# Patient Record
Sex: FEMALE | Race: WHITE | NOT HISPANIC OR LATINO | URBAN - METROPOLITAN AREA
[De-identification: names, ages, dates, MRNs, and addresses within clinical notes are randomized per-mention and may not be internally consistent; named-entity substitution may affect disease eponyms.]

---

## 2017-07-12 ENCOUNTER — APPOINTMENT (RX ONLY)
Dept: URBAN - METROPOLITAN AREA CLINIC 75 | Facility: CLINIC | Age: 42
Setting detail: DERMATOLOGY
End: 2017-07-12

## 2017-07-12 VITALS — HEIGHT: 69 IN | WEIGHT: 130 LBS

## 2017-07-12 DIAGNOSIS — D18.0 HEMANGIOMA: ICD-10-CM

## 2017-07-12 DIAGNOSIS — Z71.89 OTHER SPECIFIED COUNSELING: ICD-10-CM

## 2017-07-12 DIAGNOSIS — B07.8 OTHER VIRAL WARTS: ICD-10-CM

## 2017-07-12 DIAGNOSIS — D22 MELANOCYTIC NEVI: ICD-10-CM

## 2017-07-12 DIAGNOSIS — L81.4 OTHER MELANIN HYPERPIGMENTATION: ICD-10-CM

## 2017-07-12 PROBLEM — D22.5 MELANOCYTIC NEVI OF TRUNK: Status: ACTIVE | Noted: 2017-07-12

## 2017-07-12 PROBLEM — D18.01 HEMANGIOMA OF SKIN AND SUBCUTANEOUS TISSUE: Status: ACTIVE | Noted: 2017-07-12

## 2017-07-12 PROCEDURE — 99214 OFFICE O/P EST MOD 30 MIN: CPT

## 2017-07-12 PROCEDURE — ? IN-HOUSE DISPENSING PHARMACY

## 2017-07-12 PROCEDURE — ? COUNSELING

## 2017-07-12 ASSESSMENT — LOCATION SIMPLE DESCRIPTION DERM
LOCATION SIMPLE: RIGHT UPPER BACK
LOCATION SIMPLE: RIGHT HAND
LOCATION SIMPLE: RIGHT INDEX FINGER

## 2017-07-12 ASSESSMENT — LOCATION ZONE DERM
LOCATION ZONE: TRUNK
LOCATION ZONE: HAND
LOCATION ZONE: FINGER

## 2017-07-12 ASSESSMENT — LOCATION DETAILED DESCRIPTION DERM
LOCATION DETAILED: RIGHT INFERIOR UPPER BACK
LOCATION DETAILED: RIGHT SUPERIOR MEDIAL UPPER BACK
LOCATION DETAILED: RIGHT PROXIMAL DORSAL INDEX FINGER
LOCATION DETAILED: RIGHT DORSAL INDEX METACARPOPHALANGEAL JOINT

## 2018-06-15 ENCOUNTER — APPOINTMENT (RX ONLY)
Dept: URBAN - METROPOLITAN AREA CLINIC 76 | Facility: CLINIC | Age: 43
Setting detail: DERMATOLOGY
End: 2018-06-15

## 2018-06-15 DIAGNOSIS — Z00.00 ENCOUNTER FOR GENERAL ADULT MEDICAL EXAMINATION WITHOUT ABNORMAL FINDINGS: ICD-10-CM

## 2018-06-15 DIAGNOSIS — D22 MELANOCYTIC NEVI: ICD-10-CM

## 2018-06-15 DIAGNOSIS — B07.8 OTHER VIRAL WARTS: ICD-10-CM

## 2018-06-15 DIAGNOSIS — L81.4 OTHER MELANIN HYPERPIGMENTATION: ICD-10-CM

## 2018-06-15 DIAGNOSIS — Z87.2 PERSONAL HISTORY OF DISEASES OF THE SKIN AND SUBCUTANEOUS TISSUE: ICD-10-CM

## 2018-06-15 DIAGNOSIS — Z71.89 OTHER SPECIFIED COUNSELING: ICD-10-CM

## 2018-06-15 PROBLEM — D22.62 MELANOCYTIC NEVI OF LEFT UPPER LIMB, INCLUDING SHOULDER: Status: ACTIVE | Noted: 2018-06-15

## 2018-06-15 PROBLEM — D22.61 MELANOCYTIC NEVI OF RIGHT UPPER LIMB, INCLUDING SHOULDER: Status: ACTIVE | Noted: 2018-06-15

## 2018-06-15 PROBLEM — Z71.1 PERSON WITH FEARED HEALTH COMPLAINT IN WHOM NO DIAGNOSIS IS MADE: Status: ACTIVE | Noted: 2018-06-15

## 2018-06-15 PROBLEM — D23.72 OTHER BENIGN NEOPLASM OF SKIN OF LEFT LOWER LIMB, INCLUDING HIP: Status: ACTIVE | Noted: 2018-06-15

## 2018-06-15 PROBLEM — D48.5 NEOPLASM OF UNCERTAIN BEHAVIOR OF SKIN: Status: ACTIVE | Noted: 2018-06-15

## 2018-06-15 PROBLEM — D22.71 MELANOCYTIC NEVI OF RIGHT LOWER LIMB, INCLUDING HIP: Status: ACTIVE | Noted: 2018-06-15

## 2018-06-15 PROBLEM — D22.72 MELANOCYTIC NEVI OF LEFT LOWER LIMB, INCLUDING HIP: Status: ACTIVE | Noted: 2018-06-15

## 2018-06-15 PROBLEM — D22.5 MELANOCYTIC NEVI OF TRUNK: Status: ACTIVE | Noted: 2018-06-15

## 2018-06-15 PROBLEM — Z85.3 PERSONAL HISTORY OF MALIGNANT NEOPLASM OF BREAST: Status: ACTIVE | Noted: 2018-06-15

## 2018-06-15 PROCEDURE — ? COUNSELING

## 2018-06-15 PROCEDURE — 17110 DESTRUCTION B9 LES UP TO 14: CPT

## 2018-06-15 PROCEDURE — ? BIOPSY BY SHAVE METHOD

## 2018-06-15 PROCEDURE — 11100: CPT | Mod: 59

## 2018-06-15 PROCEDURE — 99212 OFFICE O/P EST SF 10 MIN: CPT | Mod: 25

## 2018-06-15 PROCEDURE — ? TREATMENT REGIMEN

## 2018-06-15 PROCEDURE — ? BENIGN DESTRUCTION

## 2018-06-15 ASSESSMENT — LOCATION DETAILED DESCRIPTION DERM
LOCATION DETAILED: RIGHT PROXIMAL DORSAL INDEX FINGER
LOCATION DETAILED: LEFT SUPERIOR UPPER BACK
LOCATION DETAILED: RIGHT DORSAL INDEX METACARPOPHALANGEAL JOINT
LOCATION DETAILED: RIGHT INDEX PROXIMAL INTERPHALANGEAL JOINT
LOCATION DETAILED: LEFT PROXIMAL CALF
LOCATION DETAILED: RIGHT SUPERIOR LATERAL MIDBACK
LOCATION DETAILED: RIGHT MID DORSAL INDEX FINGER
LOCATION DETAILED: LEFT DISTAL POSTERIOR UPPER ARM
LOCATION DETAILED: RIGHT MEDIAL UPPER BACK
LOCATION DETAILED: RIGHT DISTAL POSTERIOR UPPER ARM
LOCATION DETAILED: LEFT INFERIOR MEDIAL FOREHEAD
LOCATION DETAILED: LEFT NASAL SIDEWALL
LOCATION DETAILED: RIGHT INFERIOR VERMILION BORDER
LOCATION DETAILED: RIGHT PROXIMAL CALF

## 2018-06-15 ASSESSMENT — LOCATION SIMPLE DESCRIPTION DERM
LOCATION SIMPLE: LEFT POSTERIOR UPPER ARM
LOCATION SIMPLE: LEFT UPPER BACK
LOCATION SIMPLE: RIGHT POSTERIOR UPPER ARM
LOCATION SIMPLE: RIGHT LOWER LIP
LOCATION SIMPLE: RIGHT LOWER BACK
LOCATION SIMPLE: RIGHT HAND
LOCATION SIMPLE: RIGHT CALF
LOCATION SIMPLE: RIGHT INDEX FINGER
LOCATION SIMPLE: LEFT NOSE
LOCATION SIMPLE: RIGHT UPPER BACK
LOCATION SIMPLE: LEFT FOREHEAD
LOCATION SIMPLE: LEFT CALF

## 2018-06-15 ASSESSMENT — LOCATION ZONE DERM
LOCATION ZONE: FINGER
LOCATION ZONE: TRUNK
LOCATION ZONE: FACE
LOCATION ZONE: LIP
LOCATION ZONE: NOSE
LOCATION ZONE: HAND
LOCATION ZONE: ARM
LOCATION ZONE: LEG

## 2018-06-15 NOTE — PROCEDURE: BENIGN DESTRUCTION
Include Z78.9 (Other Specified Conditions Influencing Health Status) As An Associated Diagnosis?: No
Consent: The patient's consent was obtained including but not limited to risks of crusting, scabbing, blistering, scarring, darker or lighter pigmentary change, recurrence, incomplete removal and infection.
Anesthesia Volume In Cc: 0.5
Medical Necessity Information: It is in your best interest to select a reason for this procedure from the list below. All of these items fulfill various CMS LCD requirements except the new and changing color options.
Post-Care Instructions: I reviewed with the patient in detail post-care instructions. Patient is to wear sunprotection, and avoid picking at any of the treated lesions. Pt may apply Vaseline to crusted or scabbing areas.
Medical Necessity Clause: This procedure was medically necessary because the lesions that were treated were:
Treatment Number (Will Not Render If 0): 0
Detail Level: Detailed

## 2018-06-15 NOTE — PROCEDURE: BIOPSY BY SHAVE METHOD
Anesthesia Type: 1% lidocaine with epinephrine
Anesthesia Volume In Cc (Will Not Render If 0): 0.5
X Size Of Lesion In Cm: 0.6
Dressing: bandage
Notification Instructions: Patient will be notified of biopsy results in mail. However, patient instructed to call the office if they have not been contacted within 2 weeks.
Additional Anesthesia Volume In Cc (Will Not Render If 0): 0
Bill For Surgical Tray: no
Electrodesiccation And Curettage Text: The wound bed was treated with electrodesiccation and curettage after the biopsy was performed.
Accession #: [id number]
Body Location Override (Optional - Billing Will Still Be Based On Selected Body Map Location If Applicable): left upper back
Wound Care: Petrolatum
Cryotherapy Text: The wound bed was treated with cryotherapy after the biopsy was performed.
Type Of Destruction Used: Curettage
Detail Level: Detailed
Biopsy Type: H and E
Silver Nitrate Text: The wound bed was treated with silver nitrate after the biopsy was performed.
Post-Care Instructions: I reviewed with the patient in detail post-care instructions. Patient is to keep the biopsy site dry overnight, and then apply petroleum jelly twice daily until healed. Patient to return with any signs of infection.
Hemostasis: Electrocautery
Biopsy Method: Personna blade
Billing Type: Third-Party Bill
Size Of Lesion In Cm: 0.8
Electrodesiccation Text: The wound bed was treated with electrodesiccation after the biopsy was performed.
Lab: [id number]
Lab Facility: [id number]
Curettage Text: The wound bed was treated with curettage after the biopsy was performed.
Was A Bandage Applied: Yes
Consent: Written consent was obtained and risks were reviewed including but not limited to scarring, infection, bleeding, scabbing, incomplete removal, nerve damage and allergy to anesthesia.

## 2018-07-30 ENCOUNTER — APPOINTMENT (RX ONLY)
Dept: URBAN - METROPOLITAN AREA CLINIC 76 | Facility: CLINIC | Age: 43
Setting detail: DERMATOLOGY
End: 2018-07-30

## 2018-07-30 DIAGNOSIS — B07.8 OTHER VIRAL WARTS: ICD-10-CM

## 2018-07-30 PROBLEM — D235 BENIGN NEOPLASM OF SKIN OF TRUNK, EXCEPT SCROTUM: Status: ACTIVE | Noted: 2018-07-30

## 2018-07-30 PROCEDURE — ? COUNSELING

## 2018-07-30 PROCEDURE — ? BENIGN DESTRUCTION

## 2018-07-30 PROCEDURE — 17110 DESTRUCTION B9 LES UP TO 14: CPT

## 2018-07-30 ASSESSMENT — LOCATION DETAILED DESCRIPTION DERM
LOCATION DETAILED: RIGHT PROXIMAL DORSAL INDEX FINGER
LOCATION DETAILED: RIGHT DORSAL INDEX FINGER METACARPOPHALANGEAL JOINT
LOCATION DETAILED: RIGHT RADIAL DORSAL HAND

## 2018-07-30 ASSESSMENT — LOCATION ZONE DERM
LOCATION ZONE: HAND
LOCATION ZONE: FINGER

## 2018-07-30 ASSESSMENT — LOCATION SIMPLE DESCRIPTION DERM
LOCATION SIMPLE: RIGHT INDEX FINGER
LOCATION SIMPLE: RIGHT HAND

## 2018-07-30 NOTE — PROCEDURE: BENIGN DESTRUCTION
Consent: The patient's consent was obtained including but not limited to risks of crusting, scabbing, blistering, scarring, darker or lighter pigmentary change, recurrence, worsening of symptoms, pain,  incomplete removal and infection.
Medical Necessity Information: It is in your best interest to select a reason for this procedure from the list below. All of these items fulfill various CMS LCD requirements except the new and changing color options.
Anesthesia Volume In Cc: 0
Render Post-Care Instructions In Note?: yes
Include Z78.9 (Other Specified Conditions Influencing Health Status) As An Associated Diagnosis?: No
Post-Care Instructions: I reviewed with the patient in detail post-care instructions. Patient is to wear sunprotection, and avoid picking at any of the treated lesions. Pt may apply Vaseline to crusted or scabbing areas.
Medical Necessity Clause: This procedure was medically necessary because the lesions that were treated were:
Detail Level: Detailed

## 2018-08-21 ENCOUNTER — APPOINTMENT (RX ONLY)
Dept: URBAN - METROPOLITAN AREA CLINIC 76 | Facility: CLINIC | Age: 43
Setting detail: DERMATOLOGY
End: 2018-08-21

## 2018-08-21 DIAGNOSIS — B07.8 OTHER VIRAL WARTS: ICD-10-CM | Status: RESOLVED

## 2018-08-21 PROCEDURE — ? COUNSELING

## 2018-08-21 PROCEDURE — 99212 OFFICE O/P EST SF 10 MIN: CPT

## 2018-08-21 ASSESSMENT — LOCATION ZONE DERM: LOCATION ZONE: HAND

## 2018-08-21 ASSESSMENT — LOCATION DETAILED DESCRIPTION DERM: LOCATION DETAILED: RIGHT RADIAL DORSAL HAND

## 2018-08-21 ASSESSMENT — LOCATION SIMPLE DESCRIPTION DERM: LOCATION SIMPLE: RIGHT HAND

## 2019-07-18 ENCOUNTER — APPOINTMENT (RX ONLY)
Dept: URBAN - METROPOLITAN AREA CLINIC 52 | Facility: CLINIC | Age: 44
Setting detail: DERMATOLOGY
End: 2019-07-18

## 2019-07-18 DIAGNOSIS — D22 MELANOCYTIC NEVI: ICD-10-CM

## 2019-07-18 DIAGNOSIS — L57.0 ACTINIC KERATOSIS: ICD-10-CM

## 2019-07-18 PROBLEM — D22.5 MELANOCYTIC NEVI OF TRUNK: Status: ACTIVE | Noted: 2019-07-18

## 2019-07-18 PROCEDURE — ? LIQUID NITROGEN

## 2019-07-18 PROCEDURE — ? OBSERVATION

## 2019-07-18 PROCEDURE — ? COUNSELING

## 2019-07-18 PROCEDURE — 99213 OFFICE O/P EST LOW 20 MIN: CPT | Mod: 25

## 2019-07-18 PROCEDURE — 17000 DESTRUCT PREMALG LESION: CPT

## 2019-07-18 ASSESSMENT — LOCATION SIMPLE DESCRIPTION DERM
LOCATION SIMPLE: NOSE
LOCATION SIMPLE: ABDOMEN

## 2019-07-18 ASSESSMENT — LOCATION ZONE DERM
LOCATION ZONE: NOSE
LOCATION ZONE: TRUNK

## 2019-07-18 ASSESSMENT — LOCATION DETAILED DESCRIPTION DERM
LOCATION DETAILED: SUBXIPHOID
LOCATION DETAILED: NASAL DORSUM

## 2019-07-18 ASSESSMENT — PAIN INTENSITY VAS: HOW INTENSE IS YOUR PAIN 0 BEING NO PAIN, 10 BEING THE MOST SEVERE PAIN POSSIBLE?: NO PAIN

## 2019-07-18 NOTE — PROCEDURE: LIQUID NITROGEN
Consent: The patient's consent was obtained including but not limited to risks of crusting, scabbing, blistering, scarring, darker or lighter pigmentary change, recurrence, incomplete removal and infection.
Detail Level: Detailed
Render Post-Care Instructions In Note?: no
Number Of Freeze-Thaw Cycles: 3 freeze-thaw cycles
Duration Of Freeze Thaw-Cycle (Seconds): 0
Post-Care Instructions: I reviewed with the patient in detail post-care instructions. Patient is to wear sunprotection, and avoid picking at any of the treated lesions. Pt may apply Vaseline to crusted or scabbing areas.

## 2020-06-30 ENCOUNTER — APPOINTMENT (RX ONLY)
Dept: URBAN - METROPOLITAN AREA CLINIC 48 | Facility: CLINIC | Age: 45
Setting detail: DERMATOLOGY
End: 2020-06-30

## 2020-06-30 DIAGNOSIS — D22 MELANOCYTIC NEVI: ICD-10-CM

## 2020-06-30 DIAGNOSIS — Z71.89 OTHER SPECIFIED COUNSELING: ICD-10-CM

## 2020-06-30 DIAGNOSIS — Z87.2 PERSONAL HISTORY OF DISEASES OF THE SKIN AND SUBCUTANEOUS TISSUE: ICD-10-CM

## 2020-06-30 DIAGNOSIS — L82.1 OTHER SEBORRHEIC KERATOSIS: ICD-10-CM

## 2020-06-30 PROBLEM — D22.5 MELANOCYTIC NEVI OF TRUNK: Status: ACTIVE | Noted: 2020-06-30

## 2020-06-30 PROCEDURE — ? TREATMENT REGIMEN

## 2020-06-30 PROCEDURE — 99213 OFFICE O/P EST LOW 20 MIN: CPT

## 2020-06-30 PROCEDURE — ? OBSERVATION AND MEASURE

## 2020-06-30 PROCEDURE — ? COUNSELING

## 2020-06-30 ASSESSMENT — LOCATION ZONE DERM: LOCATION ZONE: TRUNK

## 2020-06-30 ASSESSMENT — LOCATION DETAILED DESCRIPTION DERM
LOCATION DETAILED: RIGHT RIB CAGE
LOCATION DETAILED: LEFT MID-UPPER BACK
LOCATION DETAILED: RIGHT LATERAL BREAST 11-12:00 REGION
LOCATION DETAILED: RIGHT LATERAL ABDOMEN
LOCATION DETAILED: RIGHT SUPERIOR LATERAL LOWER BACK
LOCATION DETAILED: LEFT SUPERIOR UPPER BACK
LOCATION DETAILED: EPIGASTRIC SKIN
LOCATION DETAILED: SUBXIPHOID

## 2020-06-30 ASSESSMENT — LOCATION SIMPLE DESCRIPTION DERM
LOCATION SIMPLE: ABDOMEN
LOCATION SIMPLE: RIGHT BREAST
LOCATION SIMPLE: LEFT UPPER BACK
LOCATION SIMPLE: RIGHT LOWER BACK

## 2020-06-30 ASSESSMENT — PAIN INTENSITY VAS: HOW INTENSE IS YOUR PAIN 0 BEING NO PAIN, 10 BEING THE MOST SEVERE PAIN POSSIBLE?: NO PAIN

## 2020-06-30 NOTE — PROCEDURE: OBSERVATION
Body Location Override (Optional - Billing Will Still Be Based On Selected Body Map Location If Applicable): left paramidline upper back
Detail Level: Detailed
Size Of Lesion: 5x4mm
Instructions (Optional): Photo taken

## 2020-06-30 NOTE — HPI: SKIN LESION
Is This A New Presentation, Or A Follow-Up?: Skin Lesion
What Type Of Note Output Would You Prefer (Optional)?: Standard Output
Has Your Skin Lesion Been Treated?: not been treated
Additional History: Patient requests full body skin exam due to many years of sun exposure. \\nPatient is afebrile at presentation and denies any symptoms consistent with COVID-19 infection. He/she has no sick contacts and has had no recent travel.

## 2021-07-28 ENCOUNTER — APPOINTMENT (RX ONLY)
Dept: URBAN - METROPOLITAN AREA CLINIC 93 | Facility: CLINIC | Age: 46
Setting detail: DERMATOLOGY
End: 2021-07-28

## 2021-07-28 VITALS — TEMPERATURE: 98 F

## 2021-07-28 DIAGNOSIS — L82.1 OTHER SEBORRHEIC KERATOSIS: ICD-10-CM

## 2021-07-28 DIAGNOSIS — D22 MELANOCYTIC NEVI: ICD-10-CM

## 2021-07-28 PROBLEM — D23.72 OTHER BENIGN NEOPLASM OF SKIN OF LEFT LOWER LIMB, INCLUDING HIP: Status: ACTIVE | Noted: 2021-07-28

## 2021-07-28 PROBLEM — D22.5 MELANOCYTIC NEVI OF TRUNK: Status: ACTIVE | Noted: 2021-07-28

## 2021-07-28 PROBLEM — D22.61 MELANOCYTIC NEVI OF RIGHT UPPER LIMB, INCLUDING SHOULDER: Status: ACTIVE | Noted: 2021-07-28

## 2021-07-28 PROCEDURE — 99213 OFFICE O/P EST LOW 20 MIN: CPT | Mod: 25

## 2021-07-28 PROCEDURE — 11300 SHAVE SKIN LESION 0.5 CM/<: CPT

## 2021-07-28 PROCEDURE — ? COUNSELING

## 2021-07-28 PROCEDURE — ? SHAVE REMOVAL

## 2021-07-28 PROCEDURE — ? PHOTO-DOCUMENTATION

## 2021-07-28 PROCEDURE — ? OBSERVATION

## 2021-07-28 ASSESSMENT — LOCATION DETAILED DESCRIPTION DERM
LOCATION DETAILED: SUPERIOR THORACIC SPINE
LOCATION DETAILED: RIGHT AXILLARY TAIL OF BREAST
LOCATION DETAILED: LEFT LATERAL BUTTOCK
LOCATION DETAILED: LEFT LATERAL MALAR CHEEK

## 2021-07-28 ASSESSMENT — LOCATION SIMPLE DESCRIPTION DERM
LOCATION SIMPLE: LEFT BUTTOCK
LOCATION SIMPLE: LEFT CHEEK
LOCATION SIMPLE: UPPER BACK
LOCATION SIMPLE: RIGHT BREAST

## 2021-07-28 ASSESSMENT — LOCATION ZONE DERM
LOCATION ZONE: FACE
LOCATION ZONE: TRUNK

## 2021-07-28 ASSESSMENT — PAIN INTENSITY VAS: HOW INTENSE IS YOUR PAIN 0 BEING NO PAIN, 10 BEING THE MOST SEVERE PAIN POSSIBLE?: NO PAIN

## 2021-07-28 NOTE — PROCEDURE: SHAVE REMOVAL
Medical Necessity Information: It is in your best interest to select a reason for this procedure from the list below. All of these items fulfill various CMS LCD requirements except the new and changing color options.
Medical Necessity Clause: This procedure was medically necessary because the lesion that was treated was:
Lab: [id number]
Detail Level: Detailed
Was A Bandage Applied: Yes
Size Of Lesion In Cm (Required): 0.4
X Size Of Lesion In Cm (Optional): 0
Biopsy Method: Dermablade
Anesthesia Type: 1% lidocaine with epinephrine
Hemostasis: Drysol
Wound Care: Petrolatum
Render Path Notes In Note?: No
Consent was obtained from the patient. The risks and benefits to therapy were discussed in detail. Specifically, the risks of infection, scarring, bleeding, prolonged wound healing, incomplete removal, allergy to anesthesia, nerve injury and recurrence were addressed. Prior to the procedure, the treatment site was clearly identified and confirmed by the patient. All components of Universal Protocol/PAUSE Rule completed.
Post-Care Instructions: I reviewed with the patient in detail post-care instructions. Patient is to keep the biopsy site dry overnight, and then apply bacitracin twice daily until healed. Patient may apply hydrogen peroxide soaks to remove any crusting.
Notification Instructions: Patient will be notified of pathology results. However, patient instructed to call the office if not contacted within 2 weeks.
Billing Type: Third-Party Bill

## 2022-08-01 ENCOUNTER — APPOINTMENT (RX ONLY)
Dept: URBAN - METROPOLITAN AREA CLINIC 93 | Facility: CLINIC | Age: 47
Setting detail: DERMATOLOGY
End: 2022-08-01

## 2022-08-01 DIAGNOSIS — Z87.2 PERSONAL HISTORY OF DISEASES OF THE SKIN AND SUBCUTANEOUS TISSUE: ICD-10-CM

## 2022-08-01 DIAGNOSIS — L57.0 ACTINIC KERATOSIS: ICD-10-CM

## 2022-08-01 DIAGNOSIS — L82.1 OTHER SEBORRHEIC KERATOSIS: ICD-10-CM

## 2022-08-01 DIAGNOSIS — D22 MELANOCYTIC NEVI: ICD-10-CM

## 2022-08-01 DIAGNOSIS — L81.4 OTHER MELANIN HYPERPIGMENTATION: ICD-10-CM

## 2022-08-01 DIAGNOSIS — L73.8 OTHER SPECIFIED FOLLICULAR DISORDERS: ICD-10-CM

## 2022-08-01 DIAGNOSIS — Z71.89 OTHER SPECIFIED COUNSELING: ICD-10-CM

## 2022-08-01 PROBLEM — D22.5 MELANOCYTIC NEVI OF TRUNK: Status: ACTIVE | Noted: 2022-08-01

## 2022-08-01 PROBLEM — D22.62 MELANOCYTIC NEVI OF LEFT UPPER LIMB, INCLUDING SHOULDER: Status: ACTIVE | Noted: 2022-08-01

## 2022-08-01 PROBLEM — D22.61 MELANOCYTIC NEVI OF RIGHT UPPER LIMB, INCLUDING SHOULDER: Status: ACTIVE | Noted: 2022-08-01

## 2022-08-01 PROCEDURE — ? COUNSELING

## 2022-08-01 PROCEDURE — ? LIQUID NITROGEN

## 2022-08-01 PROCEDURE — 17000 DESTRUCT PREMALG LESION: CPT

## 2022-08-01 PROCEDURE — 99213 OFFICE O/P EST LOW 20 MIN: CPT | Mod: 25

## 2022-08-01 ASSESSMENT — LOCATION SIMPLE DESCRIPTION DERM
LOCATION SIMPLE: LEFT UPPER BACK
LOCATION SIMPLE: LEFT SHOULDER
LOCATION SIMPLE: NOSE
LOCATION SIMPLE: LEFT POSTERIOR UPPER ARM
LOCATION SIMPLE: RIGHT SHOULDER
LOCATION SIMPLE: CHEST
LOCATION SIMPLE: RIGHT UPPER BACK
LOCATION SIMPLE: RIGHT POSTERIOR UPPER ARM
LOCATION SIMPLE: LEFT PRETIBIAL REGION

## 2022-08-01 ASSESSMENT — LOCATION DETAILED DESCRIPTION DERM
LOCATION DETAILED: RIGHT MEDIAL UPPER BACK
LOCATION DETAILED: LEFT LATERAL DISTAL PRETIBIAL REGION
LOCATION DETAILED: LEFT INFERIOR UPPER BACK
LOCATION DETAILED: LEFT MEDIAL SUPERIOR CHEST
LOCATION DETAILED: LEFT SUPERIOR UPPER BACK
LOCATION DETAILED: LEFT PROXIMAL POSTERIOR UPPER ARM
LOCATION DETAILED: NASAL DORSUM
LOCATION DETAILED: RIGHT ANTERIOR SHOULDER
LOCATION DETAILED: RIGHT PROXIMAL POSTERIOR UPPER ARM
LOCATION DETAILED: LEFT ANTERIOR SHOULDER

## 2022-08-01 ASSESSMENT — LOCATION ZONE DERM
LOCATION ZONE: ARM
LOCATION ZONE: TRUNK
LOCATION ZONE: NOSE
LOCATION ZONE: LEG

## 2022-08-01 ASSESSMENT — TOTAL NUMBER OF LESIONS: # OF LESIONS?: 1

## 2022-08-01 ASSESSMENT — PAIN INTENSITY VAS: HOW INTENSE IS YOUR PAIN 0 BEING NO PAIN, 10 BEING THE MOST SEVERE PAIN POSSIBLE?: NO PAIN

## 2022-08-01 NOTE — PROCEDURE: MIPS QUALITY
Additional Notes: Covid-19 vaccine: yes
Quality 110: Preventive Care And Screening: Influenza Immunization: Influenza Immunization previously received during influenza season
Detail Level: Detailed

## 2022-08-01 NOTE — PROCEDURE: LIQUID NITROGEN
Consent: The patient's consent was obtained including but not limited to risks of crusting, scabbing, blistering, scarring, darker or lighter pigmentary change, recurrence, incomplete removal and infection.
Detail Level: Detailed
Show Applicator Variable?: Yes
Render Note In Bullet Format When Appropriate: No
Application Tool (Optional): Liquid Nitrogen Sprayer
Post-Care Instructions: I reviewed with the patient in detail post-care instructions. Patient is to wear sunprotection, and avoid picking at any of the treated lesions. Pt may apply Vaseline to crusted or scabbing areas.
Number Of Freeze-Thaw Cycles: 2 freeze-thaw cycles
Duration Of Freeze Thaw-Cycle (Seconds): 5

## 2023-06-30 NOTE — HPI: FULL BODY SKIN EXAMINATION
What Is The Reason For Today's Visit?: Full Body Skin Examination
What Is The Reason For Today's Visit? (Being Monitored For X): the risk of recurrence of previously treated lesion(s)
No

## 2024-01-15 ENCOUNTER — APPOINTMENT (RX ONLY)
Dept: URBAN - METROPOLITAN AREA CLINIC 94 | Facility: CLINIC | Age: 49
Setting detail: DERMATOLOGY
End: 2024-01-15

## 2024-01-15 DIAGNOSIS — D18.0 HEMANGIOMA: ICD-10-CM

## 2024-01-15 DIAGNOSIS — D22 MELANOCYTIC NEVI: ICD-10-CM

## 2024-01-15 DIAGNOSIS — L81.4 OTHER MELANIN HYPERPIGMENTATION: ICD-10-CM

## 2024-01-15 DIAGNOSIS — L57.0 ACTINIC KERATOSIS: ICD-10-CM

## 2024-01-15 DIAGNOSIS — Z71.89 OTHER SPECIFIED COUNSELING: ICD-10-CM

## 2024-01-15 DIAGNOSIS — Z87.2 PERSONAL HISTORY OF DISEASES OF THE SKIN AND SUBCUTANEOUS TISSUE: ICD-10-CM

## 2024-01-15 DIAGNOSIS — L82.1 OTHER SEBORRHEIC KERATOSIS: ICD-10-CM

## 2024-01-15 PROBLEM — D18.01 HEMANGIOMA OF SKIN AND SUBCUTANEOUS TISSUE: Status: ACTIVE | Noted: 2024-01-15

## 2024-01-15 PROBLEM — D22.61 MELANOCYTIC NEVI OF RIGHT UPPER LIMB, INCLUDING SHOULDER: Status: ACTIVE | Noted: 2024-01-15

## 2024-01-15 PROBLEM — D22.72 MELANOCYTIC NEVI OF LEFT LOWER LIMB, INCLUDING HIP: Status: ACTIVE | Noted: 2024-01-15

## 2024-01-15 PROBLEM — D23.72 OTHER BENIGN NEOPLASM OF SKIN OF LEFT LOWER LIMB, INCLUDING HIP: Status: ACTIVE | Noted: 2024-01-15

## 2024-01-15 PROBLEM — D22.62 MELANOCYTIC NEVI OF LEFT UPPER LIMB, INCLUDING SHOULDER: Status: ACTIVE | Noted: 2024-01-15

## 2024-01-15 PROBLEM — D22.71 MELANOCYTIC NEVI OF RIGHT LOWER LIMB, INCLUDING HIP: Status: ACTIVE | Noted: 2024-01-15

## 2024-01-15 PROBLEM — D22.5 MELANOCYTIC NEVI OF TRUNK: Status: ACTIVE | Noted: 2024-01-15

## 2024-01-15 PROCEDURE — ? TREATMENT REGIMEN

## 2024-01-15 PROCEDURE — 99213 OFFICE O/P EST LOW 20 MIN: CPT | Mod: 25

## 2024-01-15 PROCEDURE — 17000 DESTRUCT PREMALG LESION: CPT

## 2024-01-15 PROCEDURE — ? OBSERVATION AND MEASURE

## 2024-01-15 PROCEDURE — ? COUNSELING

## 2024-01-15 PROCEDURE — ? LIQUID NITROGEN

## 2024-01-15 ASSESSMENT — LOCATION DETAILED DESCRIPTION DERM
LOCATION DETAILED: RIGHT PROXIMAL CALF
LOCATION DETAILED: INFERIOR THORACIC SPINE
LOCATION DETAILED: RIGHT ANTERIOR SHOULDER
LOCATION DETAILED: LEFT PROXIMAL CALF
LOCATION DETAILED: LEFT ANTERIOR SHOULDER
LOCATION DETAILED: LEFT SUPERIOR UPPER BACK
LOCATION DETAILED: RIGHT DISTAL POSTERIOR UPPER ARM
LOCATION DETAILED: EPIGASTRIC SKIN
LOCATION DETAILED: LEFT INFERIOR LATERAL UPPER BACK
LOCATION DETAILED: LEFT SUPERIOR MEDIAL MIDBACK
LOCATION DETAILED: RIGHT INFERIOR MEDIAL MIDBACK
LOCATION DETAILED: LEFT SUPERIOR LATERAL MALAR CHEEK
LOCATION DETAILED: LEFT LATERAL BUTTOCK
LOCATION DETAILED: LEFT DISTAL POSTERIOR UPPER ARM
LOCATION DETAILED: PERIUMBILICAL SKIN
LOCATION DETAILED: RIGHT MEDIAL UPPER BACK
LOCATION DETAILED: LEFT LATERAL DISTAL PRETIBIAL REGION

## 2024-01-15 ASSESSMENT — PAIN INTENSITY VAS: HOW INTENSE IS YOUR PAIN 0 BEING NO PAIN, 10 BEING THE MOST SEVERE PAIN POSSIBLE?: NO PAIN

## 2024-01-15 ASSESSMENT — LOCATION ZONE DERM
LOCATION ZONE: ARM
LOCATION ZONE: FACE
LOCATION ZONE: LEG
LOCATION ZONE: TRUNK

## 2024-01-15 ASSESSMENT — LOCATION SIMPLE DESCRIPTION DERM
LOCATION SIMPLE: ABDOMEN
LOCATION SIMPLE: LEFT BUTTOCK
LOCATION SIMPLE: LEFT SHOULDER
LOCATION SIMPLE: LEFT CHEEK
LOCATION SIMPLE: UPPER BACK
LOCATION SIMPLE: RIGHT SHOULDER
LOCATION SIMPLE: LEFT LOWER BACK
LOCATION SIMPLE: RIGHT CALF
LOCATION SIMPLE: LEFT UPPER BACK
LOCATION SIMPLE: LEFT CALF
LOCATION SIMPLE: LEFT PRETIBIAL REGION
LOCATION SIMPLE: RIGHT POSTERIOR UPPER ARM
LOCATION SIMPLE: RIGHT LOWER BACK
LOCATION SIMPLE: RIGHT UPPER BACK
LOCATION SIMPLE: LEFT POSTERIOR UPPER ARM

## 2024-01-15 NOTE — HPI: SKIN LESION
Is This A New Presentation, Or A Follow-Up?: Skin Lesion
What Type Of Note Output Would You Prefer (Optional)?: Standard Output
Has Your Skin Lesion Been Treated?: not been treated
Additional History: The lesion has been present for unknown duration.

## 2024-01-15 NOTE — PROCEDURE: OBSERVATION
Detail Level: Detailed
Size Of Lesion: 5 x 4 mm
Instructions (Optional): Per patient, all measured nevi present many years without change. Will continue to monitor closely.  Baseline photographs and measurements in chart today. No change from prior photographs from 2018.
Size Of Lesion: 4 x 3 mm
Size Of Lesion: 5 x 3 mm
Size Of Lesion: 3 x 4 mm
Size Of Lesion: 8 x 4 mm

## 2024-01-15 NOTE — PROCEDURE: LIQUID NITROGEN
Detail Level: Detailed
Show Aperture Variable?: Yes
Duration Of Freeze Thaw-Cycle (Seconds): 2
Render Note In Bullet Format When Appropriate: No
Number Of Freeze-Thaw Cycles: 2 freeze-thaw cycles
Post-Care Instructions: I reviewed with the patient in detail post-care instructions. Patient is to wear sunprotection, and avoid picking at any of the treated lesions. Pt may apply Vaseline to crusted or scabbing areas.
Consent: The patient's consent was obtained including but not limited to risks of crusting, scabbing, blistering, scarring, darker or lighter pigmentary change, recurrence, incomplete removal and infection.

## 2025-01-20 ENCOUNTER — APPOINTMENT (OUTPATIENT)
Dept: URBAN - METROPOLITAN AREA CLINIC 94 | Facility: CLINIC | Age: 50
Setting detail: DERMATOLOGY
End: 2025-01-20

## 2025-01-20 DIAGNOSIS — L70.8 OTHER ACNE: ICD-10-CM

## 2025-01-20 DIAGNOSIS — L82.1 OTHER SEBORRHEIC KERATOSIS: ICD-10-CM

## 2025-01-20 DIAGNOSIS — L81.4 OTHER MELANIN HYPERPIGMENTATION: ICD-10-CM

## 2025-01-20 DIAGNOSIS — Z71.89 OTHER SPECIFIED COUNSELING: ICD-10-CM

## 2025-01-20 DIAGNOSIS — Z87.2 PERSONAL HISTORY OF DISEASES OF THE SKIN AND SUBCUTANEOUS TISSUE: ICD-10-CM

## 2025-01-20 DIAGNOSIS — D18.0 HEMANGIOMA: ICD-10-CM

## 2025-01-20 DIAGNOSIS — D22 MELANOCYTIC NEVI: ICD-10-CM

## 2025-01-20 PROBLEM — D18.01 HEMANGIOMA OF SKIN AND SUBCUTANEOUS TISSUE: Status: ACTIVE | Noted: 2025-01-20

## 2025-01-20 PROBLEM — D22.72 MELANOCYTIC NEVI OF LEFT LOWER LIMB, INCLUDING HIP: Status: ACTIVE | Noted: 2025-01-20

## 2025-01-20 PROBLEM — D22.62 MELANOCYTIC NEVI OF LEFT UPPER LIMB, INCLUDING SHOULDER: Status: ACTIVE | Noted: 2025-01-20

## 2025-01-20 PROBLEM — D22.61 MELANOCYTIC NEVI OF RIGHT UPPER LIMB, INCLUDING SHOULDER: Status: ACTIVE | Noted: 2025-01-20

## 2025-01-20 PROBLEM — D22.5 MELANOCYTIC NEVI OF TRUNK: Status: ACTIVE | Noted: 2025-01-20

## 2025-01-20 PROBLEM — D23.72 OTHER BENIGN NEOPLASM OF SKIN OF LEFT LOWER LIMB, INCLUDING HIP: Status: ACTIVE | Noted: 2025-01-20

## 2025-01-20 PROBLEM — D22.71 MELANOCYTIC NEVI OF RIGHT LOWER LIMB, INCLUDING HIP: Status: ACTIVE | Noted: 2025-01-20

## 2025-01-20 PROCEDURE — ? COUNSELING

## 2025-01-20 PROCEDURE — 99213 OFFICE O/P EST LOW 20 MIN: CPT

## 2025-01-20 PROCEDURE — ? OBSERVATION AND MEASURE

## 2025-01-20 ASSESSMENT — LOCATION SIMPLE DESCRIPTION DERM
LOCATION SIMPLE: LEFT SHOULDER
LOCATION SIMPLE: LEFT PRETIBIAL REGION
LOCATION SIMPLE: UPPER BACK
LOCATION SIMPLE: ABDOMEN
LOCATION SIMPLE: RIGHT SHOULDER
LOCATION SIMPLE: RIGHT UPPER BACK
LOCATION SIMPLE: LEFT POSTERIOR UPPER ARM
LOCATION SIMPLE: RIGHT BUTTOCK
LOCATION SIMPLE: LEFT BUTTOCK
LOCATION SIMPLE: LEFT UPPER BACK
LOCATION SIMPLE: LEFT CALF
LOCATION SIMPLE: RIGHT CALF
LOCATION SIMPLE: LEFT LOWER BACK
LOCATION SIMPLE: LEFT TEMPLE
LOCATION SIMPLE: RIGHT LOWER BACK
LOCATION SIMPLE: RIGHT POSTERIOR UPPER ARM

## 2025-01-20 ASSESSMENT — LOCATION DETAILED DESCRIPTION DERM
LOCATION DETAILED: RIGHT ANTERIOR SHOULDER
LOCATION DETAILED: LEFT LATERAL DISTAL PRETIBIAL REGION
LOCATION DETAILED: RIGHT DISTAL POSTERIOR UPPER ARM
LOCATION DETAILED: RIGHT MEDIAL UPPER BACK
LOCATION DETAILED: LEFT SUPERIOR UPPER BACK
LOCATION DETAILED: LEFT DISTAL POSTERIOR UPPER ARM
LOCATION DETAILED: LEFT MEDIAL TEMPLE
LOCATION DETAILED: LEFT ANTERIOR SHOULDER
LOCATION DETAILED: RIGHT BUTTOCK
LOCATION DETAILED: RIGHT INFERIOR MEDIAL MIDBACK
LOCATION DETAILED: RIGHT PROXIMAL CALF
LOCATION DETAILED: LEFT INFERIOR LATERAL UPPER BACK
LOCATION DETAILED: PERIUMBILICAL SKIN
LOCATION DETAILED: INFERIOR THORACIC SPINE
LOCATION DETAILED: LEFT SUPERIOR MEDIAL MIDBACK
LOCATION DETAILED: LEFT LATERAL BUTTOCK
LOCATION DETAILED: LEFT PROXIMAL CALF
LOCATION DETAILED: EPIGASTRIC SKIN

## 2025-01-20 ASSESSMENT — LOCATION ZONE DERM
LOCATION ZONE: FACE
LOCATION ZONE: TRUNK
LOCATION ZONE: LEG
LOCATION ZONE: ARM

## 2025-01-20 NOTE — PROCEDURE: OBSERVATION
Detail Level: Detailed
Size Of Lesion: 5 x 4 mm
Size Of Lesion: 4 x 3 mm
Size Of Lesion: 5 x 3 mm
Size Of Lesion: 3 x 4 mm
Size Of Lesion: 8 x 4 mm
Size Of Lesion: 7 x 4mm